# Patient Record
Sex: FEMALE | Race: WHITE
[De-identification: names, ages, dates, MRNs, and addresses within clinical notes are randomized per-mention and may not be internally consistent; named-entity substitution may affect disease eponyms.]

---

## 2019-05-24 ENCOUNTER — HOSPITAL ENCOUNTER (INPATIENT)
Dept: HOSPITAL 92 - L&D | Age: 34
LOS: 6 days | Discharge: HOME | End: 2019-05-30
Attending: OBSTETRICS & GYNECOLOGY | Admitting: OBSTETRICS & GYNECOLOGY
Payer: COMMERCIAL

## 2019-05-24 VITALS — BODY MASS INDEX: 27.4 KG/M2

## 2019-05-24 DIAGNOSIS — O61.0: ICD-10-CM

## 2019-05-24 DIAGNOSIS — O40.3XX0: ICD-10-CM

## 2019-05-24 DIAGNOSIS — D68.61: ICD-10-CM

## 2019-05-24 DIAGNOSIS — F41.9: ICD-10-CM

## 2019-05-24 DIAGNOSIS — Z3A.37: ICD-10-CM

## 2019-05-24 PROCEDURE — 86901 BLOOD TYPING SEROLOGIC RH(D): CPT

## 2019-05-24 PROCEDURE — 86780 TREPONEMA PALLIDUM: CPT

## 2019-05-24 PROCEDURE — 86900 BLOOD TYPING SEROLOGIC ABO: CPT

## 2019-05-24 PROCEDURE — 87340 HEPATITIS B SURFACE AG IA: CPT

## 2019-05-24 PROCEDURE — 76815 OB US LIMITED FETUS(S): CPT

## 2019-05-24 PROCEDURE — 86850 RBC ANTIBODY SCREEN: CPT

## 2019-05-24 PROCEDURE — 85027 COMPLETE CBC AUTOMATED: CPT

## 2019-05-24 PROCEDURE — 51702 INSERT TEMP BLADDER CATH: CPT

## 2019-05-24 PROCEDURE — 36415 COLL VENOUS BLD VENIPUNCTURE: CPT

## 2019-05-27 LAB
HGB BLD-MCNC: 12.4 G/DL (ref 12–16)
MCH RBC QN AUTO: 30.2 PG (ref 27–31)
MCV RBC AUTO: 97.3 FL (ref 78–98)
PLATELET # BLD AUTO: 166 THOU/UL (ref 130–400)
RBC # BLD AUTO: 4.12 MILL/UL (ref 4.2–5.4)
SYPHILIS ANTIBODY INDEX: 0.04 S/CO
WBC # BLD AUTO: 11.5 THOU/UL (ref 4.8–10.8)

## 2019-05-27 PROCEDURE — 3E033VJ INTRODUCTION OF OTHER HORMONE INTO PERIPHERAL VEIN, PERCUTANEOUS APPROACH: ICD-10-PCS | Performed by: OBSTETRICS & GYNECOLOGY

## 2019-05-27 PROCEDURE — 3E0P7VZ INTRODUCTION OF HORMONE INTO FEMALE REPRODUCTIVE, VIA NATURAL OR ARTIFICIAL OPENING: ICD-10-PCS | Performed by: OBSTETRICS & GYNECOLOGY

## 2019-05-27 NOTE — PDOC.LDHP
Labor and Delivery H&P


Chief complaint: scheduled induction (Medically indicated delivery), other


HPI: 





Pt is a 34yo  @ 37+ weeks who is scheduled for IOL/Delivery at 37 weeks 

for hx of IUFD @ 39 weeks.  Pt has seen [facility] who recommends delivery @ 37 weeks.

  Pt has been taking ASA since prior to pregnancy for history suggestive of 

APAS and has been on Lovenox until it was held after yesterday afternoon's 

dose.  The baby has been in a transverse lie with EFW @ >95% and we have 

discussed ECV vs. 1CS.  On presentation to L and D tonight the baby was noted 

to be vtx/YAMILEX on US.  Right before SVE the presentation was noted to change to 

transverse lie.


Current gestational age (weeks): 37


Due date: 06/15/19


Dating criteria: last menstrual period, first trimester ultrasound


Grav: 3


Para: 1


OB History Details: 





 after IOL for IUFD @ 39 weeks


First trimester preg loss


Current pregnancy complications: none, other (malpresentation)


Abnormal US findings: Yes (transient polyhydramnios)


Past Medical History: 





anxiety


antiphospholipid syndrome (abnormal hexagonal neutralization and IUFD w 

placental thrombosis on pathology)


Current medications: pre-leo vitamins, other (Lovenox (last dose ), ASA, 

setraline 100mg)


Previous surgical history: none


Allergies/Adverse Reactions: 


 Allergies











Allergy/AdvReac Type Severity Reaction Status Date / Time


 


Penicillins Allergy   Verified 16 21:29











Social history: none





- Physical Exam


Vital signs reviewed and normal: yes


General: NAD


Heart: RRR


Lungs: CTAB


Abdomen: gravid


Extremeties: no edema


FHT: category 1





- OB Labs


Blood type: A


RH: positive


Antibody Screen: negative


HIV: negative


RPR: negative


HEPSAg: negative


1 hour GCT: negative


GBS: negative


Urine drug screen: not done


Rubella: immune


Additional Labs: 





NIPT low risk, AFP neg





- Plan


Plan: admit to L&D, informed consent obtained, anesthesia consult for pain 

management


-: 





A/P: 34yo  @ 37.2 weeks here for IOL but with transverse lie and 

variable presentation noted.  We discussed ECV and and the risk of ECV as well 

as the baby reverting to malpresentation.  Plan to OR tonight for 

malpresentation and planned delivery @ 37+ weeks with hx of term demise.

## 2019-05-27 NOTE — PDOC.OPDEL
OB Operative/Delivery Note


Delivery Dr/Surgeon: José Antonio


Assist: Gracie


Pre-Delivery Diagnosis: medically indicated induction (hx of term demise, 

transverse lie)


Procedure/Post Delivery Dx: primary low transverse CS


Weeks gestation: 37





- Findings


  ** A


Sex: female





- Additional Findings/Plan


Placenta delivered: spontaneous


 findings: low transverse hysterotomy without extension


Compilations/Other Findings: 





delivered from breech presentation


Low transverse hysterotomy without extension but with non-expanding hemotoma 

superior to hysterotomy-approx 3cm x 2cm


Post delivery plan: routine recovery

## 2019-05-28 LAB
HBSAG INDEX: 0.23 S/CO (ref 0–0.99)
HGB BLD-MCNC: 10.6 G/DL (ref 12–16)
MCH RBC QN AUTO: 34.1 PG (ref 27–31)
MCV RBC AUTO: 97.5 FL (ref 78–98)
PLATELET # BLD AUTO: 164 THOU/UL (ref 130–400)
RBC # BLD AUTO: 3.1 MILL/UL (ref 4.2–5.4)
WBC # BLD AUTO: 15.2 THOU/UL (ref 4.8–10.8)

## 2019-05-28 RX ADMIN — DOCUSATE CALCIUM SCH MG: 240 CAPSULE, LIQUID FILLED ORAL at 21:23

## 2019-05-28 RX ADMIN — SIMETHICONE PRN MG: 80 TABLET, CHEWABLE ORAL at 21:23

## 2019-05-28 RX ADMIN — HYDROCODONE BITARTRATE AND ACETAMINOPHEN PRN TAB: 5; 325 TABLET ORAL at 13:56

## 2019-05-28 RX ADMIN — HYDROCODONE BITARTRATE AND ACETAMINOPHEN PRN TAB: 5; 325 TABLET ORAL at 18:33

## 2019-05-28 RX ADMIN — DOCUSATE CALCIUM SCH: 240 CAPSULE, LIQUID FILLED ORAL at 10:32

## 2019-05-28 NOTE — OP
DATE OF PROCEDURE:  2019



PREOPERATIVE DIAGNOSES:  

1.  3, para 1-0-1-0 at 37 weeks and 2 days.

2. History of intrauterine fetal demise at 39 weeks, previous pregnancy.

3. Transverse lie.

4. Suspected maternal antiphospholipid antibody syndrome



POSTOPERATIVE DIAGNOSIS:  Status post primary low-transverse  section.



ASSISTANT:  Jessica Bowman MD



COMPLICATIONS:  None.



ANESTHESIA:  Spinal per Danielito Barclay CRNA and Dr. Messina



ESTIMATED BLOOD LOSS:  600 mL.



QBL:  Pending.



PROCEDURE PERFORMED:  Primary low-transverse  section.



FINDINGS:  

1. Low transverse hysterotomy without extension.

2. Clear amniotic fluid.

3. Infant in transverse lie, delivered from breech presentation.

4. Female infant's weight 6 pounds 15 ounces.  Apgars pending at the time of the

dictation. 

5. Hysterotomy, hemostatic, but with nonexpanding 3 x 2 cm hematoma superior to 
the

hysterotomy. 



INDICATIONS FOR DELIVERY:  The patient presented at 37 weeks and 2 days for 
planned

induction of labor.  The patient has been noted to have a baby with an unstable 
lie

between breech and transverse positions over the last month during weekly 
antepartum

surveillance.  On presentation to Labor and Delivery, the OB hospitalist 
performed a

bedside ultrasound with the baby noted to be in vertex presentation.  When I 
arrived

to Labor and Delivery, Leopold's maneuver was consistent with a transverse lie.
  The

ultrasound was repeated and it was noted that the baby had returned to a 
transverse

lie presentation.  The patient was counseled for external cephalic version 
versus primary section

and elected for a primary  section. 



DESCRIPTION OF PROCEDURE:  The patient was taken back to the OR with IV fluids

running.  When she was in the OR, spinal anesthesia was obtained.  The patient 
was

then placed in dorsal supine position with a left lateral tilt and a Grande 
catheter

was placed using sterile technique.  2 g of Ancef were administered.  The 
patient's

abdomen was prepped and draped in normal fashion for  section.  A

Pfannenstiel skin incision was made with a scalpel.  Incision was carried down

through the thin layer of subcutaneous tissue to the fascia.  Once the fascia 
was

reached, it was incised in the midline and extended superolaterally using curved

Miller scissors.  Kocher clamps were then placed at the superior border of the 
fascia,

which was sharply and bluntly dissected off the rectus abdominis muscles in both

cephalad and caudad directions allowing adequate room for delivery of the 
infant.

The rectus muscles were naturally  with diastasis.  The peritoneum was

bluntly entered and stretched laterally.  An Christiano O retractor was placed in 
the

abdominal cavity for retraction, visualization, and protection of the wound.  A

bladder flap was created and the bladder was dissected away from the planned

hysterotomy site.  A low-transverse hysterotomy was made with a scalpel, which 
was

then extended using the Harman maneuver.  Amniotomy was performed with clear fluid

noted.  On immediate palpation, the infant's hand and foot were noted to be the

presenting part.  The extremities were reduced into the uterus and with gentle

manipulation, the contralateral foot was brought down towards the hysterotomy.  
The

infant was then delivered from breech presentation with the upper extremities

delivered with gentle rotation and head delivered without difficulty.  The 
infant

had immediate cry after delivery.  The nose and mouth were suctioned.  The cord 
was

doubly clamped and cut, and the infant was handed off to special care nurses in

attendance.  Cord blood was collected.  The placenta was delivered.  The uterus 
was

massaged to firm and cleared of clot and debris.  Uterus was then returned to 
the

abdominal cavity.  The hysterotomy was closed in a running locked fashion with

Monocryl suture.  After the closure of the hysterotomy, a small hematoma was 
noted

just to the left of the midline at the superior border of the hysterotomy that 
was

approximately 2 x 3 cm in size.  The second layer closure was performed over the

hysterotomy, avoiding the area of the hematoma.  Pressure was applied to the 
area of

the hematoma, which was not noted to be expanding.  After pressure was released 
from

hematoma, its size was noted to be stable.  The hysterotomy and paracolic 
gutters

were irrigated and suctioned dry.  No areas of bleeding were noted.  
Hysterotomy was

noted to be dry and over a period of approximately 5 minutes of observation, the

hematoma was not noted to expand.  The rectus muscles were inspected and no 
areas of

bleeding were noted.  The fascia was closed with PDS suture from corner to 
corner.

The subcutaneous tissue was irrigated and dried.  Any small areas of bleeding 
were

controlled with Bovie cauterization.  The skin was closed with 4-0 Monocryl and 
the

skin was then dressed with Dermabond dressing.  Sterile pressure dressing was

applied.  The patient was then cleaned, dried, and taken to the recovery room in

good condition. 







Job ID:  [id number]



[facility]

## 2019-05-28 NOTE — PDOC.PP
Post Partum Progress Note


Post Partum Day #: 1


Subjective: 





doing well, no concerns, no nausea, minimal pain


PO intake tolerated: yes


Flatus: yes


Ambulation: yes


 Vital Signs (12 hours)











  Temp Pulse Resp BP Pulse Ox


 


 19 08:00  98.0 F  67  18  102/67  99


 


 19 04:50  98.0 F  63  18  113/64  98


 


 19 02:35  98.6 F  71  18  100/59 L  98


 


 19 01:30  98.5 F  71  16  111/61  98








 Weight











Weight                         170 lb

















- Physical Examination


General: NAD


Respiratory: non-labored breathing


Abdominal: no distention


Fundus firm & at: below umb


Skin: CS incision dry & intact (dressing CDI)


Neurological: no gross focal deficits


Psychiatric: A&Ox3, normal affect


Result Diagrams: 


 19 06:30





Additional Labs: 


 Post Partum Labs











Blood Type  A POSITIVE   19  20:29    


 


Hep Bs Antigen  Non-Reactive S/CO (NonReactive)   19  20:29    











(1) 37 weeks gestation of pregnancy


Code(s): Z3A.37 - 37 WEEKS GESTATION OF PREGNANCY   Status: Acute   





(2)  delivery delivered


Code(s): O82 - ENCOUNTER FOR  DELIVERY WITHOUT INDICATION   Status: 

Acute   





- Assessment/Plan





POD1 doing well, sp bedside DALE Mensah, doing well.  Advancing orders.

  Possible DC tomorrow vs. Thursday.

## 2019-05-29 RX ADMIN — HYDROCODONE BITARTRATE AND ACETAMINOPHEN PRN TAB: 5; 325 TABLET ORAL at 22:22

## 2019-05-29 RX ADMIN — HYDROCODONE BITARTRATE AND ACETAMINOPHEN PRN TAB: 5; 325 TABLET ORAL at 11:00

## 2019-05-29 RX ADMIN — HYDROCODONE BITARTRATE AND ACETAMINOPHEN PRN TAB: 5; 325 TABLET ORAL at 16:20

## 2019-05-29 RX ADMIN — DOCUSATE CALCIUM SCH MG: 240 CAPSULE, LIQUID FILLED ORAL at 09:26

## 2019-05-29 RX ADMIN — SIMETHICONE PRN MG: 80 TABLET, CHEWABLE ORAL at 21:47

## 2019-05-29 RX ADMIN — SIMETHICONE PRN MG: 80 TABLET, CHEWABLE ORAL at 11:04

## 2019-05-29 RX ADMIN — HYDROCODONE BITARTRATE AND ACETAMINOPHEN PRN TAB: 5; 325 TABLET ORAL at 06:10

## 2019-05-29 RX ADMIN — DOCUSATE CALCIUM SCH MG: 240 CAPSULE, LIQUID FILLED ORAL at 21:47

## 2019-05-29 NOTE — OP
DATE OF PROCEDURE:  2019



ADDENDUM:  I was present and scrubbed to assist the uncomplicated primary

low-transverse  with Dr. Shlomo Chou.  Please see her operative report for

full details. 







Job ID:  [id number]

## 2019-05-30 VITALS — SYSTOLIC BLOOD PRESSURE: 110 MMHG | DIASTOLIC BLOOD PRESSURE: 71 MMHG | TEMPERATURE: 98.2 F

## 2019-05-30 RX ADMIN — DOCUSATE CALCIUM SCH MG: 240 CAPSULE, LIQUID FILLED ORAL at 08:07

## 2019-05-30 NOTE — PDOC.PP
Post Partum Progress Note


Post Partum Day #: 2


Subjective: 





doing well, pain controlled w oral medications, passing gas, no N/V, latching 

well


PO intake tolerated: yes


Flatus: yes


Ambulation: yes


 Vital Signs (12 hours)











  Temp Pulse Resp BP Pulse Ox


 


 19 08:20  98.2 F  63  20  110/71  100








 Weight











Weight                         170 lb

















- Physical Examination


General: NAD


Respiratory: non-labored breathing


Abdominal: + bowel sounds


Skin: CS incision dry & intact


Neurological: no gross focal deficits


Psychiatric: A&Ox3, normal affect


Result Diagrams: 


 19 06:30





Additional Labs: 


 Post Partum Labs











Blood Type  A POSITIVE   19  20:29    


 


Hep Bs Antigen  Non-Reactive S/CO (NonReactive)   19  20:29    











(1) 37 weeks gestation of pregnancy


Code(s): Z3A.37 - 37 WEEKS GESTATION OF PREGNANCY   Status: Acute   





(2)  delivery delivered


Code(s): O82 - ENCOUNTER FOR  DELIVERY WITHOUT INDICATION   Status: 

Acute   





- Assessment/Plan





POD2 doing well, plan for DC tomorrow. 





***DELAYED ENTRY NOTE, PT SEEN BUT NOTE NOT COMPLETED.

## 2019-05-30 NOTE — PDOC.PP
Post Partum Progress Note


Post Partum Day #: 3


Subjective: 





doing well, no BM yet but good flatus, minimal pain, ambulating well, ready for 

DC


PO intake tolerated: yes


Flatus: yes


Ambulation: yes


 Vital Signs (12 hours)











  Temp Pulse Resp BP Pulse Ox


 


 19 08:20  98.2 F  63  20  110/71  100








 Weight











Weight                         170 lb

















- Physical Examination


General: NAD


Respiratory: non-labored breathing


Abdominal: no distention


Fundus firm & at: below umb


Skin: CS incision dry & intact, no rash


Neurological: no gross focal deficits


Psychiatric: A&Ox3, normal affect


Result Diagrams: 


 19 06:30





Additional Labs: 


 Post Partum Labs











Blood Type  A POSITIVE   19  20:29    


 


Hep Bs Antigen  Non-Reactive S/CO (NonReactive)   19  20:29    











(1) 37 weeks gestation of pregnancy


Code(s): Z3A.37 - 37 WEEKS GESTATION OF PREGNANCY   Status: Acute   





(2)  delivery delivered


Code(s): O82 - ENCOUNTER FOR  DELIVERY WITHOUT INDICATION   Status: 

Acute   





- Assessment/Plan





POD3 doing well, plan for DC today, continue Lovenox through 6 weeks PP, will 

hold ASA at this time.  Breast feeding well.

## 2021-05-07 ENCOUNTER — HOSPITAL ENCOUNTER (OUTPATIENT)
Dept: HOSPITAL 92 - CSHLAB | Age: 36
Discharge: HOME | End: 2021-05-07
Attending: OBSTETRICS & GYNECOLOGY
Payer: COMMERCIAL

## 2021-05-07 DIAGNOSIS — Z20.822: Primary | ICD-10-CM

## 2021-05-07 PROCEDURE — 87635 SARS-COV-2 COVID-19 AMP PRB: CPT

## 2021-05-07 PROCEDURE — U0003 INFECTIOUS AGENT DETECTION BY NUCLEIC ACID (DNA OR RNA); SEVERE ACUTE RESPIRATORY SYNDROME CORONAVIRUS 2 (SARS-COV-2) (CORONAVIRUS DISEASE [COVID-19]), AMPLIFIED PROBE TECHNIQUE, MAKING USE OF HIGH THROUGHPUT TECHNOLOGIES AS DESCRIBED BY CMS-2020-01-R: HCPCS

## 2021-05-07 PROCEDURE — U0005 INFEC AGEN DETEC AMPLI PROBE: HCPCS

## 2021-05-11 ENCOUNTER — HOSPITAL ENCOUNTER (INPATIENT)
Dept: HOSPITAL 92 - CSHLD | Age: 36
LOS: 2 days | Discharge: HOME | End: 2021-05-13
Attending: OBSTETRICS & GYNECOLOGY | Admitting: OBSTETRICS & GYNECOLOGY
Payer: COMMERCIAL

## 2021-05-11 VITALS — BODY MASS INDEX: 27.4 KG/M2

## 2021-05-11 DIAGNOSIS — O34.211: Primary | ICD-10-CM

## 2021-05-11 DIAGNOSIS — D68.61: ICD-10-CM

## 2021-05-11 DIAGNOSIS — Z20.822: ICD-10-CM

## 2021-05-11 DIAGNOSIS — Z3A.37: ICD-10-CM

## 2021-05-11 LAB
HBSAG INDEX: 0.13 S/CO (ref 0–0.99)
HGB BLD-MCNC: 12.6 G/DL (ref 12–15.5)
MCH RBC QN AUTO: 32.1 PG (ref 27–33)
MCV RBC AUTO: 94.9 FL (ref 81.6–98.3)
PLATELET # BLD AUTO: 163 10X3/UL (ref 150–450)
RBC # BLD AUTO: 3.92 10X6/UL (ref 3.9–5.03)
SYPHILIS ANTIBODY INDEX: 0.03 S/CO
WBC # BLD AUTO: 8.6 10X3/UL (ref 3.5–10.5)

## 2021-05-11 PROCEDURE — 86900 BLOOD TYPING SEROLOGIC ABO: CPT

## 2021-05-11 PROCEDURE — 51702 INSERT TEMP BLADDER CATH: CPT

## 2021-05-11 PROCEDURE — 86901 BLOOD TYPING SEROLOGIC RH(D): CPT

## 2021-05-11 PROCEDURE — 87340 HEPATITIS B SURFACE AG IA: CPT

## 2021-05-11 PROCEDURE — 85027 COMPLETE CBC AUTOMATED: CPT

## 2021-05-11 PROCEDURE — 86850 RBC ANTIBODY SCREEN: CPT

## 2021-05-11 PROCEDURE — 86780 TREPONEMA PALLIDUM: CPT

## 2021-05-11 RX ADMIN — DOCUSATE CALCIUM SCH MG: 240 CAPSULE, LIQUID FILLED ORAL at 21:17

## 2021-05-12 LAB
HGB BLD-MCNC: 10.5 G/DL (ref 12–15.5)
MCH RBC QN AUTO: 32.4 PG (ref 27–33)
MCV RBC AUTO: 95.7 FL (ref 81.6–98.3)
PLATELET # BLD AUTO: 155 10X3/UL (ref 150–450)
RBC # BLD AUTO: 3.24 10X6/UL (ref 3.9–5.03)
WBC # BLD AUTO: 11.5 10X3/UL (ref 3.5–10.5)

## 2021-05-12 RX ADMIN — HYDROCODONE BITARTRATE AND ACETAMINOPHEN PRN TAB: 5; 325 TABLET ORAL at 09:17

## 2021-05-12 RX ADMIN — HYDROCODONE BITARTRATE AND ACETAMINOPHEN PRN TAB: 5; 325 TABLET ORAL at 23:58

## 2021-05-12 RX ADMIN — HYDROCODONE BITARTRATE AND ACETAMINOPHEN PRN TAB: 5; 325 TABLET ORAL at 18:46

## 2021-05-12 RX ADMIN — DOCUSATE CALCIUM SCH MG: 240 CAPSULE, LIQUID FILLED ORAL at 22:35

## 2021-05-12 RX ADMIN — DOCUSATE CALCIUM SCH MG: 240 CAPSULE, LIQUID FILLED ORAL at 09:18

## 2021-05-12 RX ADMIN — SIMETHICONE PRN MG: 80 TABLET, CHEWABLE ORAL at 18:53

## 2021-05-13 VITALS — DIASTOLIC BLOOD PRESSURE: 68 MMHG | TEMPERATURE: 98.4 F | SYSTOLIC BLOOD PRESSURE: 110 MMHG

## 2021-05-13 RX ADMIN — DOCUSATE CALCIUM SCH MG: 240 CAPSULE, LIQUID FILLED ORAL at 08:50

## 2021-05-13 RX ADMIN — HYDROCODONE BITARTRATE AND ACETAMINOPHEN PRN TAB: 5; 325 TABLET ORAL at 08:53

## 2021-05-13 RX ADMIN — HYDROCODONE BITARTRATE AND ACETAMINOPHEN PRN TAB: 5; 325 TABLET ORAL at 12:44

## 2021-05-13 RX ADMIN — SIMETHICONE PRN MG: 80 TABLET, CHEWABLE ORAL at 08:50
